# Patient Record
Sex: MALE | Race: WHITE | URBAN - METROPOLITAN AREA
[De-identification: names, ages, dates, MRNs, and addresses within clinical notes are randomized per-mention and may not be internally consistent; named-entity substitution may affect disease eponyms.]

---

## 2022-10-30 ENCOUNTER — HOSPITAL ENCOUNTER (EMERGENCY)
Age: 47
Discharge: HOME | End: 2022-10-30
Attending: EMERGENCY MEDICINE

## 2022-10-30 ENCOUNTER — APPOINTMENT (OUTPATIENT)
Dept: GENERAL RADIOLOGY | Age: 47
End: 2022-10-30

## 2022-10-30 VITALS
SYSTOLIC BLOOD PRESSURE: 107 MMHG | HEART RATE: 68 BPM | DIASTOLIC BLOOD PRESSURE: 71 MMHG | WEIGHT: 202.6 LBS | TEMPERATURE: 98.3 F | RESPIRATION RATE: 18 BRPM | OXYGEN SATURATION: 98 %

## 2022-10-30 DIAGNOSIS — S63.259A DISLOCATION OF FINGER, INITIAL ENCOUNTER: Primary | ICD-10-CM

## 2022-10-30 PROCEDURE — 73140 X-RAY EXAM OF FINGER(S): CPT

## 2022-10-30 PROCEDURE — 26770 TREAT FINGER DISLOCATION: CPT

## 2022-10-30 PROCEDURE — 99283 EMERGENCY DEPT VISIT LOW MDM: CPT

## 2022-10-30 PROCEDURE — 73130 X-RAY EXAM OF HAND: CPT

## 2022-10-30 ASSESSMENT — PAIN - FUNCTIONAL ASSESSMENT: PAIN_FUNCTIONAL_ASSESSMENT: NONE - DENIES PAIN

## 2022-10-31 ENCOUNTER — TELEPHONE (OUTPATIENT)
Dept: ORTHOPEDIC SURGERY | Age: 47
End: 2022-10-31

## 2022-10-31 NOTE — TELEPHONE ENCOUNTER
Appointment Request     Patient requesting earlier appointment: Yes  Appointment offered to patient: YES  Patient Contact Number: [phone]    PATIENT HAS AN R FINGER INJURY. .    Jt Pounds

## 2022-10-31 NOTE — TELEPHONE ENCOUNTER
Called PT. LVM for patient stateing that we currently do not have any other open times. If a time does open up please offer patient that time slot.  Call back number [phone]

## 2022-11-02 ASSESSMENT — ENCOUNTER SYMPTOMS: COLOR CHANGE: 0

## 2022-11-02 NOTE — ED PROVIDER NOTES
[facility]      Pt Name: Shanita Chin  MRN: [id number]  [place] 1975  Date ofevaluation: 10/30/2022  Provider: Rashaad Croft MD    CHIEF COMPLAINT       Chief Complaint   Patient presents with    Finger Injury     Right index finger injury, possibly broken         HISTORY OF PRESENT ILLNESS   (Location/Symptom, Timing/Onset,Context/Setting, Quality, Duration, Modifying Factors, Severity)  Note limiting factors. Shanita Chin is a 50 y.o. male  who  has no past medical history on file. who presents to the emergency department patient of injury to the right second digit. 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 This happened a few hours prior to evaluation. Denies numbness or weakness. Denies additional injury to the hand or wrist.  Denies a history of previous injury. HPI    NursingNotes were reviewed. REVIEW OF SYSTEMS    (2-9 systems for level 4, 10 or more for level 5)     Review of Systems   Cardiovascular:  Positive for leg swelling. Musculoskeletal:  Positive for arthralgias. Skin:  Negative for color change and wound. Neurological:  Negative for weakness and numbness. Except as noted above the remainder of the review of systems was reviewed and negative. PAST MEDICAL HISTORY   History reviewed. No pertinent past medical history. SURGICALHISTORY     History reviewed. No pertinent surgical history. CURRENT MEDICATIONS     There are no discharge medications for this patient. Patient has no known allergies. FAMILY HISTORY     History reviewed. No pertinent family history.        SOCIAL HISTORY       Social History     Socioeconomic History    Marital status:      Spouse name: None    Number of children: None    Years of education: None    Highest education level: None   Tobacco Use    Smoking status: Every Day     Packs/day: 1.00     Types: Cigarettes    Smokeless tobacco: Never   Substance and Sexual Activity    Alcohol use: Yes     Comment: occass    Drug use: Never       SCREENINGS    [place] Coma Scale  Eye Opening: Spontaneous  Best Verbal Response: Oriented  Best Motor Response: Obeys commands  [place] Coma Scale Score: 15        PHYSICAL EXAM    (up to 7 for level 4, 8 or more for level 5)     ED Triage Vitals [10/30/22 2100]   BP Temp Temp Source Heart Rate Resp SpO2 Height Weight   107/75 98.3 °F (36.8 °C) Temporal 68 18 98 % -- 202 lb 9.6 oz (91.9 kg)       Physical Exam  Vitals reviewed. Constitutional:       General: He is not in acute distress. Appearance: He is well-developed. HENT:      Head: Normocephalic and atraumatic. Eyes:      Conjunctiva/sclera: Conjunctivae normal.   Neck:      Trachea: No tracheal deviation. Cardiovascular:      Rate and Rhythm: Normal rate and regular rhythm. Pulmonary:      Effort: Pulmonary effort is normal.      Breath sounds: Normal breath sounds. No wheezing or rales. Abdominal:      General: There is no distension. Palpations: Abdomen is soft. Tenderness: There is no abdominal tenderness. Musculoskeletal:         General: Tenderness, deformity and signs of injury present. Normal range of motion. Cervical back: Normal range of motion. Skin:     General: Skin is warm and dry. Capillary Refill: Capillary refill takes less than 2 seconds. Findings: No bruising or erythema. Neurological:      General: No focal deficit present. Mental Status: He is alert and oriented to person, place, and time.        RESULTS     EKG: All EKG's are interpreted by the Emergency Department Physician who either signs or Co-signsthis chart in the absence of a cardiologist.      RADIOLOGY:   Non-plain filmimages such as CT, Ultrasound and MRI are read by the radiologist. Plain radiographic images are visualized and preliminarily interpreted by the emergency physician with the below findings:      Interpretation per the Radiologist below, if available at the time ofthis note:    XR FINGER RIGHT (MIN 2 VIEWS)   Final Result   Successful reduction of 2nd PIP dislocation. XR HAND RIGHT (MIN 3 VIEWS)   Final Result   Dislocation of the right 2nd PIP joint. No appreciated fracture. ED BEDSIDE ULTRASOUND:   Performed by ED Physician - none    LABS:  Labs Reviewed - No data to display    All other labs were within normal range or not returned as of this dictation. EMERGENCY DEPARTMENT COURSE and DIFFERENTIAL DIAGNOSIS/MDM:   Vitals:    Vitals:    10/30/22 2100 10/30/22 2320   BP: 107/75 107/71   Pulse: 68 68   Resp: 18 18   Temp: 98.3 °F (36.8 °C) 98.3 °F (36.8 °C)   TempSrc: Temporal Oral   SpO2: 98% 98%   Weight: 202 lb 9.6 oz (91.9 kg)        Patient was given thefollowing medications:  Medications - No data to display    ED COURSE & MEDICAL DECISION MAKING    Pertinent Labs & Imaging studies reviewed. (See chart for details)   -  Patient seen and evaluated in the emergency department. -  Triage and nursing notes reviewed and incorporated. -  Old chart records reviewed and incorporated. -  Differential diagnosis includes: Differential diagnosis: includes but not limited to Arterial Injury/Ischemia, Fracture, Dislocation, Infection, Compartment Syndrome, Neurologic Deficit/Injury. -  Work-up included:  See above  -  ED treatment included: See above  -  Results discussed with patient. Patient is a dislocation of the second digit at the PCP. Finger is neurovascular intact . imaging studies show the finger without fracture. Nerve block was performed and traction applied with reduction of the dislocation. Patient placed in a splint and given orthopedic referral. .  Patient feels improved on reevaluation.   Symptomatic treatment with expectant management discussed with the patient and they and/or family members present are amenable to treatment plan and outpatient follow-up. Strict return precautions were discussed with the patient and those present. They demonstrated understanding of when to return to the emergency department for new or worsening symptoms. .  The patient is agreeable with plan of care and disposition. Is this patient to be included in the SEP-1 Core Measure due to severe sepsis or septic shock? No   Exclusion criteria - the patient is NOT to be included for SEP-1 Core Measure due to: Infection is not suspected      REASSESSMENT          CRITICAL CARE TIME   Total Critical Care time was 0 minutes, excluding separately reportable procedures. There was a high probability of clinically significant/life threatening deterioration in the patient's condition which required my urgent intervention. CONSULTS:  None    PROCEDURES:  Unless otherwise noted below, none     Ortho Injury    Date/Time: 11/2/2022 6:08 AM  Performed by: Chrissie Primrose, MD  Authorized by: Chrissie Primrose, MD   Consent: Verbal consent obtained. Written consent obtained. Consent given by: patient  Patient understanding: patient states understanding of the procedure being performed  Patient consent: the patient's understanding of the procedure matches consent given  Procedure consent: procedure consent matches procedure scheduled  Relevant documents: relevant documents present and verified  Test results: test results available and properly labeled  Site marked: the operative site was marked  Imaging studies: imaging studies available  Patient identity confirmed: verbally with patient and arm band  Time out: Immediately prior to procedure a \"time out\" was called to verify the correct patient, procedure, equipment, support staff and site/side marked as required.   Injury location: finger  Location details: right index finger  Injury type: dislocation  Dislocation type: PIP  Pre-procedure distal perfusion: normal  Pre-procedure neurological function: normal  Pre-procedure range of motion: reduced  Anesthesia: digital block    Anesthesia:  Local anesthesia used: yes  Local Anesthetic: lidocaine 1% without epinephrine  Anesthetic total: 5 mL    Sedation:  Patient sedated: no    Manipulation performed: yes  Reduction successful: yes  X-ray confirmed reduction: yes  Immobilization: splint  Splint type: static finger  Supplies used: aluminum splint  Post-procedure neurovascular assessment: post-procedure neurovascularly intact  Post-procedure distal perfusion: normal  Post-procedure neurological function: normal  Post-procedure range of motion: normal        FINAL IMPRESSION      1. Dislocation of finger, initial encounter          DISPOSITION/PLAN   DISPOSITION Decision To Discharge 10/30/2022 11:15:14 PM      PATIENT REFERREDTO:  Casi Carlson MD  [address] [address]  [phone]    Schedule an appointment as soon as possible for a visit in 1 day  For wound re-check    [facility] [place] [facility] [place]  [phone]          DISCHARGEMEDICATIONS:  There are no discharge medications for this patient.          (Please note that portions of this note were completed with a voice recognition program.  Efforts were made to edit the dictations but occasionally words are mis-transcribed.)    John Bryant MD (electronically signed)  Attending Emergency Physician          John Bryant MD  11/02/22 5661

## 2022-11-10 ENCOUNTER — OFFICE VISIT (OUTPATIENT)
Dept: ORTHOPEDIC SURGERY | Age: 47
End: 2022-11-10

## 2022-11-10 VITALS — WEIGHT: 199 LBS | HEIGHT: 72 IN | BODY MASS INDEX: 26.95 KG/M2 | RESPIRATION RATE: 16 BRPM

## 2022-11-10 DIAGNOSIS — S63.279A DISLOCATION, FINGER, INTERPHALANGEAL JOINT, INITIAL ENCOUNTER: Primary | ICD-10-CM

## 2022-11-10 PROCEDURE — 99203 OFFICE O/P NEW LOW 30 MIN: CPT | Performed by: ORTHOPAEDIC SURGERY

## 2022-11-10 NOTE — PROGRESS NOTES
This 50 y.o.  right hand dominant  is seen in referral for the ED at [facility]   with a chief complaint of injury to their right index finger, which was injured 2 weeks ago when hyperextended putting up a shelf. He noticed pain, swelling, and deformity. He was evaluated at the Hospital, diagnosed as having a dislocation, which was reduced. Xrays were obtained confirming the reduction and absence of associated fracture and the patient was splinted and  referred for hand/upper extremity evaluation and treatment. The patient has not worn the splint continuously since it was applied. There is no history of additional significant injury. Symptoms have improved since the date of injury. The pain assessment has been reviewed and is correct. The patient's social history, past medical history, family history, medications, allergies and review of systems, entered 11/10/22,  have been reviewed, and dated and are recorded in the chart. On physical examination the patient is Height: 6' (182.9 cm) tall and weighs Weight: 199 lb (90.3 kg). Respirations are 18 per minute. The patient is well nourished, is oriented to time and place, demonstrates appropriate mood and affect as well as normal gait and station. There is mild soft tissue swelling present about the right index finger. There is no discoloration. There is no deformity. Tenderness is present on palpation in the area of the right index finger, PIP joint  Range of motion of the index finger is mildly limited only on the right and is accompanied by mild pain. Skin is intact, as is distal circulation and sensation. Gross muscle strength is limited only on the right   All hand and wrist joints are stable. There are no subcutaneous nodules or enlarged epitrochlear lymph nodes.     I have personally reviewed and interpreted all previous external imaging studies(hand Xrays), laboratory tests, diagnostic procedures and medical encounters (ortho injury note) pertinent to this patient's visit today. Xrays: Review and independent interpretation of the recent external Xrays of the right hand and index finger demonstrate reduction of the dislocation. Impression: Dislocation PIP joint right index finger, reduced and syable. The nature of this medical problem is fully discussed with the patient, including all treatment options. All questions are answered. He is instructed about activity precautions, prn splint use and a range of motion exercise program, which is fully discussed and demonstrated. The usual course of events in the resolution of the symptoms associated with this condition is fully discussed with the patient. As long as they progress as expected, they do not need to return for further follow up. They are, however, urged to call or return if they have questions or concerns or if full painless function of their index finger has not returned by 2 weeks from today.